# Patient Record
Sex: FEMALE | Race: WHITE | Employment: FULL TIME | URBAN - METROPOLITAN AREA
[De-identification: names, ages, dates, MRNs, and addresses within clinical notes are randomized per-mention and may not be internally consistent; named-entity substitution may affect disease eponyms.]

---

## 2022-08-08 ENCOUNTER — OFFICE VISIT (OUTPATIENT)
Dept: DERMATOLOGY | Age: 42
End: 2022-08-08
Payer: COMMERCIAL

## 2022-08-08 DIAGNOSIS — D22.5 MULTIPLE BENIGN MELANOCYTIC NEVI OF UPPER AND LOWER EXTREMITIES AND TRUNK: Primary | ICD-10-CM

## 2022-08-08 DIAGNOSIS — D22.60 MULTIPLE BENIGN MELANOCYTIC NEVI OF UPPER AND LOWER EXTREMITIES AND TRUNK: Primary | ICD-10-CM

## 2022-08-08 DIAGNOSIS — D22.70 MULTIPLE BENIGN MELANOCYTIC NEVI OF UPPER AND LOWER EXTREMITIES AND TRUNK: Primary | ICD-10-CM

## 2022-08-08 DIAGNOSIS — L81.4 LENTIGINES: ICD-10-CM

## 2022-08-08 DIAGNOSIS — D18.01 CHERRY ANGIOMA: ICD-10-CM

## 2022-08-08 PROCEDURE — 99203 OFFICE O/P NEW LOW 30 MIN: CPT | Performed by: INTERNAL MEDICINE

## 2022-08-08 NOTE — PROGRESS NOTES
[facility] Dermatology  Kenny Granda MD  [phone]    Date of Visit: 8/8/2022    Winifred Oneil is a 38 y.o. female with PMH asthma who presents for skin lesion. New patient    Chief Complaint: Moles     History of Present Illness:    Concern: Mole  Location: R foot  Duration: Since age 23  Symptoms: Not changing; looks different from her other moles  Previous treatments: None    Patient would like a full body skin exam to evaluation all skin lesions and moles. She denies any other changing lesions. Mole removed from back as teen  Family history: No family history of melanoma    Review of Systems:  Gen: Feels well, good sense of health. Skin: No new or changing moles, no history of keloids or hypertrophic scars. Heme: No abnormal bruising or bleeding. Past Medical History, Family History, Surgical History, Medications and Allergies reviewed. Past Medical History:   Diagnosis Date    Anxiety     Mild intermittent asthma without complication      Past Surgical History:   Procedure Laterality Date    DILATION AND CURETTAGE OF UTERUS         No Known Allergies  Outpatient Medications Marked as Taking for the 8/8/22 encounter (Office Visit) with Danial Downing MD   Medication Sig Dispense Refill    albuterol sulfate HFA (VENTOLIN HFA) 108 (90 Base) MCG/ACT inhaler Inhale 2 puffs into the lungs 4 times daily as needed for Wheezing 18 g 0    DULoxetine (CYMBALTA) 60 MG extended release capsule Take 1 capsule by mouth in the morning.  30 capsule 3    lisinopril (PRINIVIL;ZESTRIL) 5 MG tablet TAKE ONE TABLET BY MOUTH DAILY 30 tablet 3    montelukast (SINGULAIR) 10 MG tablet Take 1 tablet by mouth nightly 30 tablet 3       Social History:  Occupation:  Works at [employer] in [address]     The following were examined and determined to be normal: Scalp/hair, Head/face, Conjunctivae/eyelids, Neck, breast/axilla/chest, Abdomen, Back, RUE, LUE, RLE, LLE, Nails/digits, and buttocks. The following were examined and determined to be abnormal: Abdomen, Back, face, chest, RLE.     1) Multiple tan to brown 2 to 4 mm macules and papules on back, abdomen, chest, arms  2) Multiple tan macules on sun exposed areas of bilateral arms, chest, face, and neck  3) 4 mm well defined, homogenous brown macule on right medial foot  4) Scattered bright red 2 mm papules on R arm and back     Assessment and Plan     1. Multiple benign melanocytic nevi of upper and lower extremities and trunk  -Benign, reassurance  -Recommend daily sunscreen use with SPF greater than 30, broad-spectrum    2. Lentigines  -Benign, reassurance  -Recommended sunscreen use as above    3. Cherry angioma  -Benign, reassurance    Return in about 1 year (around 8/8/2023).     Tiffanie Monreal MD

## 2022-08-08 NOTE — PATIENT INSTRUCTIONS
Thank you for visiting [facility] Dermatology    Sun Protection Tips    Apply a broad spectrum water resistant sunscreen with an SPF of at least 30 to exposed areas of the skin. Dont forget the ears and lips! Remember to reapply sunscreen about every 2 hours and after swimming or sweating. Wear sun protective clothing. Swim shirts (AKA, rash guards) are a great idea and negate the need to reapply sunscreen in those areas.      Seek the shade whenever possible especially between the hours of 10 am and 4 pm when the suns rays are the strongest.     Avoid tanning beds

## 2022-11-01 ENCOUNTER — OFFICE VISIT (OUTPATIENT)
Dept: ENT CLINIC | Age: 42
End: 2022-11-01
Payer: COMMERCIAL

## 2022-11-01 VITALS
HEART RATE: 83 BPM | BODY MASS INDEX: 30 KG/M2 | WEIGHT: 163 LBS | RESPIRATION RATE: 16 BRPM | SYSTOLIC BLOOD PRESSURE: 135 MMHG | DIASTOLIC BLOOD PRESSURE: 95 MMHG | HEIGHT: 62 IN

## 2022-11-01 DIAGNOSIS — K11.20 PAROTITIS: Primary | ICD-10-CM

## 2022-11-01 DIAGNOSIS — K11.20 PAROTITIS: ICD-10-CM

## 2022-11-01 DIAGNOSIS — R60.9 PAROTID SWELLING: ICD-10-CM

## 2022-11-01 DIAGNOSIS — R51.9 FACIAL PAIN: ICD-10-CM

## 2022-11-01 DIAGNOSIS — R68.2 DRY MOUTH: ICD-10-CM

## 2022-11-01 PROCEDURE — 1036F TOBACCO NON-USER: CPT | Performed by: STUDENT IN AN ORGANIZED HEALTH CARE EDUCATION/TRAINING PROGRAM

## 2022-11-01 PROCEDURE — G8484 FLU IMMUNIZE NO ADMIN: HCPCS | Performed by: STUDENT IN AN ORGANIZED HEALTH CARE EDUCATION/TRAINING PROGRAM

## 2022-11-01 PROCEDURE — G8417 CALC BMI ABV UP PARAM F/U: HCPCS | Performed by: STUDENT IN AN ORGANIZED HEALTH CARE EDUCATION/TRAINING PROGRAM

## 2022-11-01 PROCEDURE — G8427 DOCREV CUR MEDS BY ELIG CLIN: HCPCS | Performed by: STUDENT IN AN ORGANIZED HEALTH CARE EDUCATION/TRAINING PROGRAM

## 2022-11-01 PROCEDURE — 99204 OFFICE O/P NEW MOD 45 MIN: CPT | Performed by: STUDENT IN AN ORGANIZED HEALTH CARE EDUCATION/TRAINING PROGRAM

## 2022-11-01 NOTE — PROGRESS NOTES
Caitie Manzano (:  1980) is a 38 y.o. female, here for evaluation of the following chief complaint(s):  New Patient (Salivary gland stone )      ASSESSMENT/PLAN:  1. Parotitis  -     Sjogren's Ab (SS-A, SS-B); Future  -     LUPUS (LE) PANEL W/ REFLEX; Future  -     CHARLY Reflex to Antibody Cascade; Future  -     CT SOFT TISSUE NECK W CONTRAST; Future  2. Parotid swelling  3. Dry mouth  4. Facial pain    This is a very pleasant 38 y.o. female here today for evaluation of the the above-noted complaints. Patient provides a history consistent with current right-sided parotitis. We will obtain a CT scan to rule out an underlying mass lesion or obstructing lesion such as a sialolith. We will also obtain an autoimmune work-up panel to rule out underlying autoimmune cause such as Sjogren's. Follow-up after lab and imaging obtained to discuss neck steps. Medical Decision Making: The following items were considered in medical decision making:  Independent review of images  Review / order clinical lab tests  Review / order radiology tests  Decision to obtain old records  Review and summation of [facility] records as [facility] if [facility]    SUBJECTIVE/OBJECTIVE:  RAMON Benedict is here today for evaluation of recurrent right-sided parotitis. The patient states that this has been present for many years. She will get episodes where her right parotid gland will swell will become painful and tender to the touch. She has never developed an abscess, facial nerve weakness or required surgical intervention for this in the past.  She usually does not require antibiotics for this. The patient states that she has previously seen ENT for this and they tried to dilate her parotid duct in the office but this did not improve her symptoms. She tries drink a lot of water.   She uses sialagogues and parotid massages. These temporarily help her symptoms but they always return. She denies fevers, chills, night sweats. No weakness of the face. She gets some occasional dry mouth. She denies family history of autoimmune disease or Sjogren's disease. I independently reviewed her most recent lab work including her CBC and BMP. It was within normal limits. REVIEW OF SYSTEMS  The following systems were reviewed and revealed the following in addition to any already discussed in the HPI:    PHYSICAL EXAM    GENERAL: No acute distress, alert and oriented, no hoarseness, strong voice  EYES: EOMI, Anti-icteric  HENT:   Head: Normocephalic and atraumatic. Face:  Symmetric, facial nerve intact  Right Ear: Normal external ear, normal external auditory canal, intact tympanic membrane with normal mobility and aerated middle ear  Left Ear: Normal external ear, normal external auditory canal, intact tympanic membrane with normal mobility and aerated middle ear  Mouth/Oral Cavity:  normal lips, Uvula is midline, no mucosal lesions, no trismus, normal dentition, normal salivary quality/flow  Oropharynx/Larynx:  normal oropharynx, unremarkable tonsils  Nose:Normal external nasal appearance. Anterior rhinoscopy shows  a deviated septum preventing view posteriorly. turbinates. Normal mucosa   NECK: Normal range of motion, no thyromegaly, trachea is midline, no lymphadenopathy, no neck masses, no crepitus    Right parotid gland firmness with enlargement and TTP. No discrete mass noted. Able to express scan thick secretions from Mickey's duct on the right. PROCEDURE      This note was generated completely or in part utilizing Dragon dictation speech recognition software. Occasionally, words are mistranscribed and despite editing, the text may contain inaccuracies due to incorrect word recognition. If further clarification is needed please contact the office at [phone].     An electronic signature was used to authenticate this note.     --Jose Ramon Rae MD

## 2022-11-02 LAB
ANTI-CENTROMERE B IGG: <0.2 AI (ref 0–0.9)
ANTI-CHROMATIN IGG: <0.2 AI (ref 0–0.9)
ANTI-DSDNA IGG: 4 IU/ML (ref 0–9)
ANTI-JO1 IGG: <0.2 AI (ref 0–0.9)
ANTI-NUCLEAR ANTIBODY (ANA): POSITIVE
ANTI-RIBOSOMAL P IGG: <0.2 AI (ref 0–0.9)
ANTI-RNP IGG: <0.2 AI (ref 0–0.9)
ANTI-SCL70 IGG: <0.2 AI (ref 0–0.9)
ANTI-SMITH IGG: <0.2 AI (ref 0–0.9)
ANTI-SMRNP IGG: <0.2 AI (ref 0–0.9)
ANTI-SS-A IGG: 4.2 AI (ref 0–0.9)
ANTI-SS-A IGG: 4.2 AI (ref 0–0.9)
ANTI-SS-B IGG: <0.2 AI (ref 0–0.9)
ANTI-SS-B IGG: <0.2 AI (ref 0–0.9)

## 2022-11-04 LAB
ANA IGG, ELISA: NORMAL
ANA PATTERN: ABNORMAL
ANA TITER: ABNORMAL
ANTINUCLEAR AB INTERPRETIVE COMMENT: ABNORMAL
ANTINUCLEAR ANTIBODY, HEP-2, IGG: DETECTED
C3 COMPLEMENT: 144 MG/DL (ref 90–180)
C4 COMPLEMENT: 29 MG/DL (ref 10–40)
RHEUMATOID FACTOR: <10 IU/ML (ref 0–14)

## 2022-11-09 ENCOUNTER — TELEPHONE (OUTPATIENT)
Dept: ENT CLINIC | Age: 42
End: 2022-11-09

## 2022-11-09 DIAGNOSIS — R68.2 DRY MOUTH: Primary | ICD-10-CM

## 2022-11-09 NOTE — TELEPHONE ENCOUNTER
Called patient and reviewed laboratory results. We discussed that she likely has an underlying autoimmune disease, most likely Sjogren's. I offered her referral to rheumatologist.  She prefers to wait until follow-up and we will discuss that in her CT scan at the time.

## 2022-11-22 ENCOUNTER — HOSPITAL ENCOUNTER (OUTPATIENT)
Dept: CT IMAGING | Age: 42
Discharge: HOME | End: 2022-11-22
Payer: COMMERCIAL

## 2022-11-22 DIAGNOSIS — K11.20 PAROTITIS: ICD-10-CM

## 2022-11-22 PROCEDURE — 6360000004 HC RX CONTRAST MEDICATION: Performed by: STUDENT IN AN ORGANIZED HEALTH CARE EDUCATION/TRAINING PROGRAM

## 2022-11-22 PROCEDURE — 70491 CT SOFT TISSUE NECK W/DYE: CPT

## 2022-11-22 RX ADMIN — IOPAMIDOL 75 ML: 755 INJECTION, SOLUTION INTRAVENOUS at 07:51

## 2022-11-29 ENCOUNTER — OFFICE VISIT (OUTPATIENT)
Dept: ENT CLINIC | Age: 42
End: 2022-11-29
Payer: COMMERCIAL

## 2022-11-29 VITALS
HEART RATE: 87 BPM | WEIGHT: 163 LBS | BODY MASS INDEX: 29.81 KG/M2 | SYSTOLIC BLOOD PRESSURE: 149 MMHG | DIASTOLIC BLOOD PRESSURE: 92 MMHG

## 2022-11-29 DIAGNOSIS — J32.3 SPHENOID SINUSITIS, UNSPECIFIED CHRONICITY: ICD-10-CM

## 2022-11-29 DIAGNOSIS — R68.2 DRY MOUTH: ICD-10-CM

## 2022-11-29 DIAGNOSIS — K11.20 PAROTITIS: ICD-10-CM

## 2022-11-29 DIAGNOSIS — J32.0 MAXILLARY SINUSITIS, UNSPECIFIED CHRONICITY: ICD-10-CM

## 2022-11-29 DIAGNOSIS — M35.00 SJOGREN SYNDROME, UNSPECIFIED (HCC): Primary | ICD-10-CM

## 2022-11-29 PROCEDURE — G8484 FLU IMMUNIZE NO ADMIN: HCPCS | Performed by: STUDENT IN AN ORGANIZED HEALTH CARE EDUCATION/TRAINING PROGRAM

## 2022-11-29 PROCEDURE — G8427 DOCREV CUR MEDS BY ELIG CLIN: HCPCS | Performed by: STUDENT IN AN ORGANIZED HEALTH CARE EDUCATION/TRAINING PROGRAM

## 2022-11-29 PROCEDURE — 1036F TOBACCO NON-USER: CPT | Performed by: STUDENT IN AN ORGANIZED HEALTH CARE EDUCATION/TRAINING PROGRAM

## 2022-11-29 PROCEDURE — 99214 OFFICE O/P EST MOD 30 MIN: CPT | Performed by: STUDENT IN AN ORGANIZED HEALTH CARE EDUCATION/TRAINING PROGRAM

## 2022-11-29 PROCEDURE — G8417 CALC BMI ABV UP PARAM F/U: HCPCS | Performed by: STUDENT IN AN ORGANIZED HEALTH CARE EDUCATION/TRAINING PROGRAM

## 2022-11-29 RX ORDER — DOXYCYCLINE HYCLATE 100 MG
100 TABLET ORAL 2 TIMES DAILY
Qty: 20 TABLET | Refills: 0 | Status: SHIPPED | OUTPATIENT
Start: 2022-11-29 | End: 2022-12-09

## 2022-11-29 NOTE — PROGRESS NOTES
[address] TOLU Manzano (:  1980) is a 38 y.o. female, here for evaluation of the following chief complaint(s):  Results (CT)      ASSESSMENT/PLAN:  1. Sjogren syndrome, unspecified (RUSTca 75.)  -     Rosi Ly MD, Rheumatology, [facility]  2. Dry mouth  3. Parotitis  4. Maxillary sinusitis, unspecified chronicity  5. Sphenoid sinusitis, unspecified chronicity    This is a very pleasant 38 y.o. female here today for evaluation of the the above-noted complaints. Patient provides a history consistent with current right-sided parotitis. We obtained a CT scan and it shows no evidence of underlying mass lesions. It does show evidence of inflammation of the maxillary ethmoid sphenoid sinuses. We will also obtain an autoimmune work-up panel to rule out underlying autoimmune cause such as Sjogren's. Autoimmune work-up panel was positive for anti-SSA and CHARLY. I will refer her to my colleague in rheumatology for further work-up and management. We will start her on doxycycline for 10 days for her sinonasal issues. Follow-up in 2 months and we will plan to recheck the nose. The risks, benefits and alternatives to antibiotics were discussed with patient in detail including but not limited to the risk of GI upset, xerostomia, anaphylaxis and rash/ sun sensitivity. The patient was instructed to contact me should they develop any adverse reactions or have other concerns. Medical Decision Making: The following items were considered in medical decision making:  Independent review of images  Review / order clinical lab tests  Review / order radiology tests  Decision to obtain old records  Review and summation of [facility]    SUBJECTIVE/OBJECTIVE:  RAMON Acosta Ivette is here today for evaluation of recurrent right-sided parotitis.   The patient states that this has been present for many years. She will get episodes where her right parotid gland will swell will become painful and tender to the touch. She has never developed an abscess, facial nerve weakness or required surgical intervention for this in the past.  She usually does not require antibiotics for this. The patient states that she has previously seen ENT for this and they tried to dilate her parotid duct in the office but this did not improve her symptoms. She tries drink a lot of water. She uses sialagogues and parotid massages. These temporarily help her symptoms but they always return. She denies fevers, chills, night sweats. No weakness of the face. She gets some occasional dry mouth. She denies family history of autoimmune disease or Sjogren's disease. I independently reviewed her most recent lab work including her CBC and BMP. It was within normal limits. Update 11/29/2022:    Patient presents today for follow-up for issues related to her nose. States that she recently got sick a couple weeks ago. She has had no further swelling of her parotid gland. Is getting some postnasal drainage but feels like her nose is clear at this time. REVIEW OF SYSTEMS  The following systems were reviewed and revealed the following in addition to any already discussed in the HPI:    PHYSICAL EXAM    GENERAL: No acute distress, alert and oriented, no hoarseness, strong voice  EYES: EOMI, Anti-icteric  HENT:   Head: Normocephalic and atraumatic.    Face:  Symmetric, facial nerve intact  Right Ear: Normal external ear, normal external auditory canal, intact tympanic membrane with normal mobility and aerated middle ear  Left Ear: Normal external ear, normal external auditory canal, intact tympanic membrane with normal mobility and aerated middle ear  Mouth/Oral Cavity:  normal lips, Uvula is midline, no mucosal lesions, no trismus, normal dentition, normal salivary quality/flow  Oropharynx/Larynx:  normal oropharynx, unremarkable tonsils  Nose:Normal external nasal appearance. Anterior rhinoscopy shows  a deviated septum preventing view posteriorly. turbinates. Normal mucosa   NECK: Normal range of motion, no thyromegaly, trachea is midline, no lymphadenopathy, no neck masses, no crepitus    Resolution of the right-sided parotid gland fullness and tenderness. PROCEDURE      This note was generated completely or in part utilizing Dragon dictation speech recognition software. Occasionally, words are mistranscribed and despite editing, the text may contain inaccuracies due to incorrect word recognition. If further clarification is needed please contact the office at [phone]. An electronic signature was used to authenticate this note.     --Scarlet Patel MD

## 2023-01-31 ENCOUNTER — TELEPHONE (OUTPATIENT)
Dept: ENT CLINIC | Age: 43
End: 2023-01-31

## 2023-01-31 DIAGNOSIS — M35.00 SJOGREN SYNDROME, UNSPECIFIED (HCC): Primary | ICD-10-CM

## 2023-01-31 NOTE — TELEPHONE ENCOUNTER
Dr Malachi Kennedy referred pt to rheumatologist but they are not accepting new pts. Pt found another one, but they are requesting a referral and labs and scans before they will schedule her.     Dr. Hannah Newton  Ph [phone]